# Patient Record
Sex: FEMALE | Race: WHITE | NOT HISPANIC OR LATINO | URBAN - METROPOLITAN AREA
[De-identification: names, ages, dates, MRNs, and addresses within clinical notes are randomized per-mention and may not be internally consistent; named-entity substitution may affect disease eponyms.]

---

## 2024-01-16 DIAGNOSIS — K21.9 GASTROESOPHAGEAL REFLUX DISEASE, UNSPECIFIED WHETHER ESOPHAGITIS PRESENT: ICD-10-CM

## 2024-01-16 RX ORDER — OMEPRAZOLE 20 MG/1
20 CAPSULE, DELAYED RELEASE ORAL
Qty: 180 CAPSULE | Refills: 0 | Status: SHIPPED | OUTPATIENT
Start: 2024-01-16 | End: 2024-04-11 | Stop reason: SDUPTHER

## 2024-01-16 RX ORDER — OMEPRAZOLE 20 MG/1
20 CAPSULE, DELAYED RELEASE ORAL
COMMUNITY
End: 2024-01-16 | Stop reason: SDUPTHER

## 2024-02-13 ENCOUNTER — OFFICE VISIT (OUTPATIENT)
Dept: GASTROENTEROLOGY | Facility: CLINIC | Age: 64
End: 2024-02-13
Payer: MEDICARE

## 2024-02-13 VITALS — WEIGHT: 122 LBS | HEIGHT: 57 IN | HEART RATE: 78 BPM | BODY MASS INDEX: 26.32 KG/M2

## 2024-02-13 DIAGNOSIS — K21.9 GASTROESOPHAGEAL REFLUX DISEASE, UNSPECIFIED WHETHER ESOPHAGITIS PRESENT: Primary | ICD-10-CM

## 2024-02-13 PROCEDURE — 1036F TOBACCO NON-USER: CPT | Performed by: INTERNAL MEDICINE

## 2024-02-13 PROCEDURE — 99213 OFFICE O/P EST LOW 20 MIN: CPT | Performed by: INTERNAL MEDICINE

## 2024-02-13 RX ORDER — ATORVASTATIN CALCIUM 40 MG/1
40 TABLET, FILM COATED ORAL
COMMUNITY
Start: 2023-04-03

## 2024-02-13 RX ORDER — ESTRADIOL 10 UG/1
10 TABLET VAGINAL DAILY
COMMUNITY
Start: 2023-06-23

## 2024-02-13 RX ORDER — LEVETIRACETAM 500 MG/1
1000 TABLET, EXTENDED RELEASE ORAL 2 TIMES DAILY
COMMUNITY
Start: 2023-03-22

## 2024-02-13 RX ORDER — LEVETIRACETAM 250 MG/1
1 TABLET ORAL DAILY
COMMUNITY
Start: 2017-12-06 | End: 2024-02-13 | Stop reason: ALTCHOICE

## 2024-02-13 RX ORDER — BUSPIRONE HYDROCHLORIDE 15 MG/1
22.5 TABLET ORAL 2 TIMES DAILY
COMMUNITY

## 2024-02-13 RX ORDER — ESTRADIOL 0.1 MG/G
2 CREAM VAGINAL DAILY
COMMUNITY
Start: 2023-04-03

## 2024-02-13 RX ORDER — MV-MIN/FA/VIT K/LUTEIN/ZEAXANT 200MCG-5MG
1 CAPSULE ORAL
COMMUNITY

## 2024-02-13 RX ORDER — OXAPROZIN 600 MG/1
900 TABLET, FILM COATED ORAL DAILY
COMMUNITY

## 2024-02-13 RX ORDER — METHYLPHENIDATE HYDROCHLORIDE 36 MG/1
36 TABLET ORAL
COMMUNITY

## 2024-02-13 RX ORDER — LACOSAMIDE 100 MG/1
100 TABLET ORAL 2 TIMES DAILY
COMMUNITY

## 2024-02-13 RX ORDER — PSYLLIUM HUSK 0.4 G
1 CAPSULE ORAL DAILY
COMMUNITY
Start: 2017-10-23

## 2024-02-13 RX ORDER — OLANZAPINE 10 MG/1
TABLET ORAL NIGHTLY
COMMUNITY

## 2024-02-13 RX ORDER — CHOLECALCIFEROL (VITAMIN D3) 25 MCG
1 TABLET ORAL DAILY
COMMUNITY
Start: 2018-11-14

## 2024-02-13 RX ORDER — PHENOL 1.4 %
1 AEROSOL, SPRAY (ML) MUCOUS MEMBRANE DAILY
COMMUNITY
Start: 2018-11-14

## 2024-02-13 RX ORDER — ONDANSETRON HYDROCHLORIDE 8 MG/1
1 TABLET, FILM COATED ORAL EVERY 8 HOURS PRN
COMMUNITY

## 2024-02-13 NOTE — PROGRESS NOTES
Subjective     History of Present Illness:   Mildred Moore is a 63 y.o. female who presents to GI clinic for GERD well controlled.  Had seizure in November.  Has never had diagnosis of COVID or illness that she thought were consistent with it.  Most foods don't appeal.  Only  sweets taste good and appeal to her.    Diet Coke 2 cans/day  Coffee cup/day    BK:  Toast, fruit    Dinner:  Smoothie: (20 oz.)  Yogurt  Fairlife milk  Fruit  Spinach  Review of Systems  Review of Systems    Social History   reports that she has never smoked. She has never been exposed to tobacco smoke. She has never used smokeless tobacco.     Allergies  Allergies   Allergen Reactions    Amoxicillin-Pot Clavulanate Shortness of breath    Adhesive Tape-Silicones Unknown     blistered underneath  adhesive tape after WADA    Penicillins Swelling    Phenytoin Sodium Extended Unknown    Hydantoins Rash    Latex Rash       Medications  Current Outpatient Medications   Medication Instructions    atorvastatin (LIPITOR) 40 mg, oral, Daily RT    busPIRone (BUSPAR) 22.5 mg, oral, 2 times daily    calcium carbonate-vitamin D3 1,000 mg-20 mcg (800 unit) tablet 1 each, oral, Daily    cholecalciferol (Vitamin D-3) 25 MCG (1000 UT) tablet 1 tablet, oral, Daily    estradiol (ESTRACE) 2 g, vaginal, Daily    lacosamide (VIMPAT) 100 mg, oral, 2 times daily    Lactobacillus acidophilus 10 billion cell capsule 1 each, oral, Daily    levETIRAcetam XR (KEPPRA XR) 1,000 mg, oral, 2 times daily    methylphenidate ER (CONCERTA) 36 mg, oral, Daily RT    multivitamin with minerals (Adults Multivitamin) tablet 1 tablet, oral, Daily    mv-min-FA-vit K-lutein-zeaxant (PreserVision AREDS 2 Plus MV) 200 mcg-15 mcg- 5 mg-1 mg capsule 1 tablet, oral, Daily RT    OLANZapine (ZyPREXA) 10 mg tablet Nightly    omeprazole (PRILOSEC) 20 mg, oral, 2 times daily before meals    ondansetron (Zofran) 8 mg tablet 1 tablet, oral, Every 8 hours PRN    Yuvafem 10 mcg, vaginal, Daily     "    Objective   Visit Vitals  Pulse 78      Physical Exam  Constitutional:       General: She is not in acute distress.     Appearance: She is normal weight.   Neurological:      Mental Status: She is alert.                     Assessment/Plan   Mildred Moore is a 63 y.o. female who presents to GI clinic for GERD -- currently well-controlled on omeprazole with no \"warning signs\" to suggest neoplasia.    Plan    Continue current therapy..      Rosas Veloz MD         "

## 2024-04-11 DIAGNOSIS — K21.9 GASTROESOPHAGEAL REFLUX DISEASE, UNSPECIFIED WHETHER ESOPHAGITIS PRESENT: ICD-10-CM

## 2024-04-11 RX ORDER — OMEPRAZOLE 20 MG/1
20 CAPSULE, DELAYED RELEASE ORAL
Qty: 180 CAPSULE | Refills: 2 | Status: SHIPPED | OUTPATIENT
Start: 2024-04-11

## 2024-12-04 DIAGNOSIS — K21.9 GASTROESOPHAGEAL REFLUX DISEASE, UNSPECIFIED WHETHER ESOPHAGITIS PRESENT: ICD-10-CM

## 2024-12-04 RX ORDER — OMEPRAZOLE 20 MG/1
20 CAPSULE, DELAYED RELEASE ORAL
Qty: 180 CAPSULE | Refills: 0 | Status: SHIPPED | OUTPATIENT
Start: 2024-12-04

## 2025-02-21 DIAGNOSIS — K21.9 GASTROESOPHAGEAL REFLUX DISEASE, UNSPECIFIED WHETHER ESOPHAGITIS PRESENT: ICD-10-CM

## 2025-02-21 RX ORDER — OMEPRAZOLE 20 MG/1
20 CAPSULE, DELAYED RELEASE ORAL
Qty: 180 CAPSULE | Refills: 0 | Status: SHIPPED | OUTPATIENT
Start: 2025-02-21

## 2025-05-30 DIAGNOSIS — K21.9 GASTROESOPHAGEAL REFLUX DISEASE, UNSPECIFIED WHETHER ESOPHAGITIS PRESENT: ICD-10-CM

## 2025-06-03 RX ORDER — OMEPRAZOLE 20 MG/1
CAPSULE, DELAYED RELEASE ORAL
Qty: 180 CAPSULE | Refills: 0 | Status: SHIPPED | OUTPATIENT
Start: 2025-06-03

## 2025-08-05 DIAGNOSIS — R11.0 NAUSEA: Primary | ICD-10-CM

## 2025-08-05 RX ORDER — ONDANSETRON 8 MG/1
8 TABLET, FILM COATED ORAL EVERY 8 HOURS PRN
Qty: 20 TABLET | Refills: 0 | Status: SHIPPED | OUTPATIENT
Start: 2025-08-05

## 2025-08-08 ENCOUNTER — APPOINTMENT (OUTPATIENT)
Dept: ORTHOPEDIC SURGERY | Facility: CLINIC | Age: 65
End: 2025-08-08
Payer: MEDICARE

## 2025-08-08 ENCOUNTER — HOSPITAL ENCOUNTER (OUTPATIENT)
Dept: RADIOLOGY | Facility: CLINIC | Age: 65
Discharge: HOME | End: 2025-08-08
Payer: MEDICARE

## 2025-08-08 DIAGNOSIS — M79.671 RIGHT FOOT PAIN: ICD-10-CM

## 2025-08-08 DIAGNOSIS — M20.21 HALLUX RIGIDUS OF RIGHT FOOT: Primary | ICD-10-CM

## 2025-08-08 DIAGNOSIS — M20.41 HAMMER TOE OF RIGHT FOOT: ICD-10-CM

## 2025-08-08 PROCEDURE — G2211 COMPLEX E/M VISIT ADD ON: HCPCS | Performed by: ORTHOPAEDIC SURGERY

## 2025-08-08 PROCEDURE — 73630 X-RAY EXAM OF FOOT: CPT | Mod: RIGHT SIDE | Performed by: RADIOLOGY

## 2025-08-08 PROCEDURE — 73630 X-RAY EXAM OF FOOT: CPT | Mod: RT

## 2025-08-08 PROCEDURE — 99203 OFFICE O/P NEW LOW 30 MIN: CPT | Performed by: ORTHOPAEDIC SURGERY

## 2025-08-08 PROCEDURE — 99204 OFFICE O/P NEW MOD 45 MIN: CPT | Performed by: ORTHOPAEDIC SURGERY

## 2025-08-08 PROCEDURE — 73610 X-RAY EXAM OF ANKLE: CPT | Mod: RIGHT SIDE | Performed by: RADIOLOGY

## 2025-08-08 PROCEDURE — 73610 X-RAY EXAM OF ANKLE: CPT | Mod: RT

## 2025-08-08 ASSESSMENT — PAIN - FUNCTIONAL ASSESSMENT: PAIN_FUNCTIONAL_ASSESSMENT: 0-10

## 2025-08-08 ASSESSMENT — PAIN SCALES - GENERAL: PAINLEVEL_OUTOF10: 2

## 2025-08-08 NOTE — PROGRESS NOTES
"Mildred Moore    CHIEF COMPLAINT:  ***    Surgery Date: 2024  Surgery: Left first MTP fusion     HISTORY OF PRESENT ILLNESS:  This is a 65 y.o. female who presents today with  ***    Occupation: {Occupation:25074}  Nicotine (Smoking/Vaping) History: {JUSTIN SMOKER IVF:75127}  Personal or Family Hx of DVT/PE: {YES-DESCRIBE/NO:47646}  Metal Allergy: {YES-DESCRIBE/NO:53318}  Diabetic:   {YES-DESCRIBE/NO:85784}  Last Hgba1c:   No results found for: \"HGBA1C\"    Assessment/Plan:  ***  1st mTP fusion, 2nd hammertoe correction +weil        Follow up {follow up:73172}, with *** films upon return.    Thank you for the opportunity to participate in this patient's care.    Physical Exam:  Well appearing female in no acute distress; Alert and oriented.  {RIGHT LEFT BILAT:92326} Lower Extremity:   Grossly intact ROM and strength, no obvious deformity.  {RIGHT LEFT BILAT:77228}  Lower Extremity:  Gait Cycle:  {GAIT ORTHO:22690}  Inspection:  Swelling: {yes,no:21728} Redness: {yes,no:09786}  Ecchymosis: {yes,no:43447} Effusion: {yes,no:03247}    Alignment: {POD FOOT DEFORMITIES:56450}  Pain on palpation:  {anatomy; location tenderness ankle:288963::\"None\"}  ROM: {rom:152745}  Strength: {PE ANKLE/FOOT FRACTURE STRENGTH ORTHOSUR::\"normal\"}  Stability:  {POD ANKLE STABILITY:59300}  Specialized Tests:   Mitchell Test: {:796318}  Syndesmotic Squeeze Test:  {POSITIVE/NEGATIVE:55499}  Calcaneal Squeeze Test:  {POSITIVE/NEGATIVE:57706}  Metatarsal Squeeze / Compression Test:  {POSITIVE/NEGATIVE:63552}  Hop/Hockey Tape Test:  {POSITIVE/NEGATIVE:27662}  Neurologic Status:  Sensation to all 4 compartments of lower extremity are grossly intact to light touch today in the office.***  Vascular Status:   Tibialis posterior pulse: {:239326}  Dorsalis pedis pulse: {:183872}  Skin:  Normal      IMAGING:     Imaging was ordered today***. Final results and radiologist's interpretation, available in the [facility] health record. Images were " reviewed with the patient/family members in the office today. My personal interpretation of the performed imaging is ***    ***Previous imaging performed on ***, and available in the Epic health record, showed ***.    Sherry Boles MD

## 2025-08-21 ENCOUNTER — APPOINTMENT (OUTPATIENT)
Dept: GASTROENTEROLOGY | Facility: CLINIC | Age: 65
End: 2025-08-21
Payer: MEDICARE

## 2025-08-21 VITALS — BODY MASS INDEX: 25.33 KG/M2 | WEIGHT: 117.4 LBS | HEART RATE: 63 BPM | HEIGHT: 57 IN | OXYGEN SATURATION: 98 %

## 2025-08-21 DIAGNOSIS — K21.9 GASTROESOPHAGEAL REFLUX DISEASE, UNSPECIFIED WHETHER ESOPHAGITIS PRESENT: Primary | ICD-10-CM

## 2025-08-21 DIAGNOSIS — R11.0 NAUSEA: ICD-10-CM

## 2025-08-21 PROCEDURE — 3008F BODY MASS INDEX DOCD: CPT | Performed by: INTERNAL MEDICINE

## 2025-08-21 PROCEDURE — 99213 OFFICE O/P EST LOW 20 MIN: CPT | Performed by: INTERNAL MEDICINE

## 2025-08-21 RX ORDER — NABUMENTONE 750 MG/1
TABLET ORAL
COMMUNITY

## 2025-08-21 RX ORDER — ESCITALOPRAM OXALATE 10 MG/1
TABLET ORAL
COMMUNITY
Start: 2025-08-19

## 2025-08-21 RX ORDER — ZONISAMIDE 100 MG/1
100 CAPSULE ORAL
COMMUNITY
Start: 2025-04-15

## 2025-08-21 RX ORDER — TERBINAFINE HYDROCHLORIDE 250 MG/1
1 TABLET ORAL
COMMUNITY
Start: 2024-12-22

## 2025-08-21 RX ORDER — CLONAZEPAM 0.5 MG/1
TABLET, ORALLY DISINTEGRATING ORAL
COMMUNITY
Start: 2025-03-21

## 2025-08-21 RX ORDER — LORAZEPAM 1 MG/1
TABLET ORAL
COMMUNITY
Start: 2025-02-21

## 2025-08-21 RX ORDER — TRETINOIN 0.25 MG/G
CREAM TOPICAL
COMMUNITY
Start: 2024-04-08

## 2025-08-21 RX ORDER — TRIAMCINOLONE ACETONIDE 1 MG/G
CREAM TOPICAL
COMMUNITY
Start: 2025-02-25